# Patient Record
Sex: MALE | Race: BLACK OR AFRICAN AMERICAN | HISPANIC OR LATINO
[De-identification: names, ages, dates, MRNs, and addresses within clinical notes are randomized per-mention and may not be internally consistent; named-entity substitution may affect disease eponyms.]

---

## 2023-02-16 ENCOUNTER — NON-APPOINTMENT (OUTPATIENT)
Age: 48
End: 2023-02-16

## 2023-02-16 ENCOUNTER — APPOINTMENT (OUTPATIENT)
Dept: ORTHOPEDIC SURGERY | Facility: CLINIC | Age: 48
End: 2023-02-16
Payer: OTHER MISCELLANEOUS

## 2023-02-16 PROCEDURE — 76882 US LMTD JT/FCL EVL NVASC XTR: CPT

## 2023-02-16 PROCEDURE — 99072 ADDL SUPL MATRL&STAF TM PHE: CPT

## 2023-02-16 PROCEDURE — 99204 OFFICE O/P NEW MOD 45 MIN: CPT

## 2023-02-16 NOTE — ASSESSMENT
[Physical Disability Temporary Total] : temporarily total disabled [Can the patient return to usual work activities as indicated? If yes, indicate date___] : The patient cannot return to usual work activities as indicated.

## 2023-02-16 NOTE — PHYSICAL EXAM
[de-identified] : \par  PHYSICAL EXAM  RIGHT  SHOULDER - RHD \par \par BICEPS DEFORMITY CONSISTENT CLAUS LONG HEAD BICEPS RUPTURE\par MODERATE PROTRACTION \par AROM 110 / 110 / 90 / 10\par TENDER: SA REGION ANTERIOR,  BICEPS GROOVE AND  BICEPS MUSCLE \par \par SPECIAL TESTING :\par HOLLINGSWORTH - POSITIVE \par CATRACHITA - POSITIVE \par SPEED TEST - POSITIVE\par \par SINGH - NEGATIVE \par APPREHENSION AND SUPPRESSION - NEGATIVE \par \par RC STRENGTH TESTING \par SS:  4/5\par SUB 5/5\par IS     5/5\par BICEPS  5/5\par \par SENSATION  - GROSSLY INTACT\par \par  [de-identified] : XR RIGHT SHOULDER 2 VIEWS: 02/12/2023 \par IMPRESSION: NO ACUTE OSSEOUS OR SOFT TISSUE INJURY IS NOTED. JOINT SPACES ARE WELL- PRESERVED. \par \par DIAGNOSTIC ULTRASOUND RIGHT SHOULDER\par \par DIAGNOSTIC SONOGRAPHY of the Rotator Cuff Soft Tissue of the RIGHT SHOULDER was performed in Multiple Scan Planes with varying transducer frequencies.\par Imaging of the Supraspinatus Tendon reveals TENDONITIS,  WITH OUT SIGNIFICANT / COMPLETE TEAR\par Imaging of the Biceps Tendon reveals COMPLETE RUPTURE WITH RETRACTION.\par Imaging of the Subscapularis Tendon reveals no significant tear.\par Imaging of the Infraspinatus Tendon reveals no significant tear.\par Key images were save digitally and reviewed with patient.\par \par

## 2023-02-16 NOTE — DISCUSSION/SUMMARY
[de-identified] : 47-YEAR-OLD GENERALLY HEALTHY MALE RIGHT-HAND-DOMINANT IS EMPLOYED AS A .  WAS LIFTING A HEAVY PIECE 30 POUND INSERTING ON A MACHINE HE FELT A SNAP AND SHARP PULL IN HIS RIGHT SHOULDER.  PATIENT SEEN IN THE EMERGENCY ROOM X-RAYS WERE REPORTEDLY NEGATIVE\par \par PHYSICAL EXAM TODAY REVEALS PAINFUL LIMITED ACTIVE MOTION OF THE SHOULDER WITH OBVIOUS BICEPS DEFORMITY CONSISTENT WITH LONG HEAD OF THE BICEPS RUPTURE.  PATIENT IS UNABLE TO USE HIS ARM BECAUSE OF PAIN AND WEAKNESS HAS PAIN CONSTANTLY SOMETIMES 10/10.  HAS LIMITED RELIEF FROM IBUPROFEN PROVIDED BY THE EMERGENCY ROOM\par \par DIAGNOSTIC ULTRASOUND TODAY REVEALS COMPLETE RUPTURE OF THE BICEPS TENDON PROXIMALLY.\par \par I HAVE ORDERED MRI OF THE SHOULDER TO RULE OUT OTHER INJURIES ASSOCIATED POSSIBLE IMPINGEMENT SYNDROME AND ROTATOR CUFF INJURY\par \par IN HIS AGE GROUP LONG HEAD BICEPS RUPTURE MIGHT REQUIRE REPAIR/TENODESIS\par \par WORK STATUS TEMPORARILY TOTALLY DISABLED

## 2023-02-16 NOTE — HISTORY OF PRESENT ILLNESS
[FreeTextEntry1] : LOCATION:RIGHT SHOULDER \par DATE OF INJURY: 02/12/2023- PATIENT STATED THAT HE  IS A -PT SAID HE WAS Lifting A HEAVY PIECE APPROX. 30LBS  TO INSERT ON MACHINE AND HE FELT A SHARP PULL IN RIGHT SHOULDER  \par NO PREVIOUS SHOULDER PAIN \par QUALITY:SHARP, ACHY, THROBBING \par RADIATING PAIN UP NECK \par CONSTANT \par PAIN LEVEL: 10/10 \par BETTER WITH RESTING, ICE, PACKS, \par WORSE WITH OVER HEAD LIFTING, REACHING BEHIND \par ASSOCIATED SYMPTOMS: LIMITED ROM \par ASSOCIATED CLICKING/LOCKING/POPPING/GRINDING \par PRIOR STUDIES: X-RAY REPORT \par PT VISITED [place] [facility] RO[facility] 02/12/23- PT WAS PRESCRIBED IBUPROFEN AND HAD AN INJECTION, GAVE PT A SLING \par \par

## 2023-03-06 ENCOUNTER — APPOINTMENT (OUTPATIENT)
Dept: ORTHOPEDIC SURGERY | Facility: CLINIC | Age: 48
End: 2023-03-06
Payer: OTHER MISCELLANEOUS

## 2023-03-06 VITALS — HEIGHT: 65 IN | WEIGHT: 174 LBS | BODY MASS INDEX: 28.99 KG/M2

## 2023-03-06 PROCEDURE — 99072 ADDL SUPL MATRL&STAF TM PHE: CPT

## 2023-03-06 PROCEDURE — 99213 OFFICE O/P EST LOW 20 MIN: CPT

## 2023-03-15 ENCOUNTER — APPOINTMENT (OUTPATIENT)
Dept: ORTHOPEDIC SURGERY | Facility: CLINIC | Age: 48
End: 2023-03-15
Payer: OTHER MISCELLANEOUS

## 2023-03-15 PROCEDURE — 99213 OFFICE O/P EST LOW 20 MIN: CPT

## 2023-03-15 PROCEDURE — 99072 ADDL SUPL MATRL&STAF TM PHE: CPT

## 2023-03-15 NOTE — HISTORY OF PRESENT ILLNESS
[de-identified] : RIGHT SHOULDER \par NO CHANGES\par HARD TO SLEEP AT NIGHT \par PAIN: 10/10\par FOLLOW UP MRI RESULTS \par \par \par PREVIOUS HPI\par Symptoms and relevant review of symptoms: LOCATION:RIGHT SHOULDER \par DATE OF INJURY: 02/12/2023- PATIENT STATED THAT HE IS A -PT SAID HE WAS Lifting A HEAVY PIECE APPROX. 30LBS TO INSERT ON MACHINE AND HE FELT A SHARP PULL IN RIGHT SHOULDER \par NO PREVIOUS SHOULDER PAIN \par QUALITY:SHARP, ACHY, THROBBING \par RADIATING PAIN UP NECK \par CONSTANT \par PAIN LEVEL: 10/10 \par BETTER WITH RESTING, ICE, PACKS, \par WORSE WITH OVER HEAD LIFTING, REACHING BEHIND \par ASSOCIATED SYMPTOMS: LIMITED ROM \par ASSOCIATED CLICKING/LOCKING/POPPING/GRINDING \par PRIOR STUDIES: X-RAY REPORT \par PT VISITED [place] [facility] RO[facility] 02/12/23- PT WAS PRESCRIBED IBUPROFEN AND HAD AN INJECTION, GAVE PT A SLING \par

## 2023-03-15 NOTE — DISCUSSION/SUMMARY
[de-identified] : PREOP SHOULDER SURGERY DISCUSSION:\par \par PROCEDURE DISCUSSED - QUESTIONS ANSWERED\par PATIENT WISHES TO PROCEED\par \par POST OP CARE AND LIMITATIONS REVIEWED - HANDOUT PROVIDED \par \par COLD PACKS RECOMMENDED\par ANALGESICS AND  ANTI NAUSEA MEDS PRESCRIBED \par COLACE RECOMMENDED\par \par \par THERE ARE NO GUARANTEES THAT ALL SYMPTOMS WILL BE ALLEVIATED  \par SHOULDER ARTHROSCOPY, ACROMIOPLASTY, DEBRIDEMENT,  RC REPAIR AND LABRUM REPAIRS- ON AVERAGE 75- 85% SATISFACTORY RESULTS FOR TEARS < 3CM AFTER 9-12 MONTHS HEALING AND REHABILITATION. \par \par REPAIRS WILL REQUIRE STRICT SHOULDER IMMOBILIZER 4-6 WEEKS\par \par RC TEARS 3CM OR LARGER MAY REQUIRE COLLAGEN PATCH AUGMENTATION GENERALLY HAVE LESS SATISFACTORY RESULTS\par \par PHYSICAL THERAPY REQUIRED 2X WEEK FOR  MINIMUM 8-12 WEEKS FOR ALL PROCEDURES \par CONTINUED HOME EXERCISES 6-9 MONTHS AFTER THAT REQUIRED FOR OPTIMAL OUTCOMES \par \par ROUTINE SURGICAL AND ANESTHETIC RISKS INCLUDE RISK OF SURGICAL INFECTION, ANESTHETIC COMPLICATION OR ALLERGY, POSSIBLE RETEARS OR PROGRESSION OF TEAR, STIFFNESS OF SHOULDER AND UNSATISFACTORY OUTCOMES\par \par PATIENT UNDERSTANDS AND WISHES TO PROCEED\par

## 2023-03-15 NOTE — PHYSICAL EXAM
[de-identified] : \par  PHYSICAL EXAM  RIGHT  SHOULDER - RHD \par \par BICEPS DEFORMITY CONSISTENT WITH LONG HEAD BICEPS RUPTURE\par MODERATE PROTRACTION \par AROM 110 / 110 / 90 / 10\par TENDER: SA REGION ANTERIOR,  BICEPS GROOVE AND  BICEPS MUSCLE \par \par SPECIAL TESTING :\par HOLLINGSWORTH - POSITIVE \par CATRACHITA - POSITIVE \par SPEED TEST - POSITIVE\par \par SINGH - NEGATIVE \par APPREHENSION AND SUPPRESSION - NEGATIVE \par \par RC STRENGTH TESTING \par SS:  4/5\par SUB 5/5\par IS     5/5\par BICEPS  5/5\par \par SENSATION  - GROSSLY INTACT\par \par  [de-identified] :  \par EXAM:  MRI RIGHT SHOULDER WITHOUT CONTRAST\par Date of Exam: 02-\par IMPRESSION: \par \par 1. Mild AC joint arthrosis.\par 2. Mildly thickened and inflamed subacromial/subdeltoid bursa. There is no significant subacromial enthesophyte formation, and the undersurface of the acromion is flat.\par 3. Study negative for partial or full-thickness rotator cuff tear. There is moderate tendinosis in the supraspinatus and infraspinatus tendons.\par 4. Complete rupture of the long biceps tendon; the tendon is torn off of the superior glenoid, and retracted downward into the arm.\par 5. The superior labrum appears truncated; the remainder o

## 2023-03-21 ENCOUNTER — APPOINTMENT (OUTPATIENT)
Dept: ORTHOPEDIC SURGERY | Facility: HOSPITAL | Age: 48
End: 2023-03-21

## 2023-03-24 ENCOUNTER — APPOINTMENT (OUTPATIENT)
Dept: ORTHOPEDIC SURGERY | Facility: CLINIC | Age: 48
End: 2023-03-24

## 2023-04-26 ENCOUNTER — FORM ENCOUNTER (OUTPATIENT)
Age: 48
End: 2023-04-26

## 2023-04-26 ENCOUNTER — APPOINTMENT (OUTPATIENT)
Dept: ORTHOPEDIC SURGERY | Facility: CLINIC | Age: 48
End: 2023-04-26
Payer: OTHER MISCELLANEOUS

## 2023-04-26 PROCEDURE — 99213 OFFICE O/P EST LOW 20 MIN: CPT

## 2023-05-02 ENCOUNTER — FORM ENCOUNTER (OUTPATIENT)
Age: 48
End: 2023-05-02

## 2023-06-01 ENCOUNTER — APPOINTMENT (OUTPATIENT)
Dept: ORTHOPEDIC SURGERY | Facility: CLINIC | Age: 48
End: 2023-06-01
Payer: OTHER MISCELLANEOUS

## 2023-06-01 PROCEDURE — 99213 OFFICE O/P EST LOW 20 MIN: CPT

## 2023-06-01 NOTE — PHYSICAL EXAM
[de-identified] : \par  PHYSICAL EXAM  RIGHT  SHOULDER - RHD \par \par BICEPS DEFORMITY CONSISTENT WITH LONG HEAD BICEPS RUPTURE\par MODERATE PROTRACTION \par AROM  70 / 70\par TENDER: SA REGION ANTERIOR,  BICEPS GROOVE AND  BICEPS MUSCLE \par \par SPECIAL TESTING :\par HOLLINGSWORTH - POSITIVE \par CATRACHITA - POSITIVE \par SPEED TEST - POSITIVE\par \par SINGH - NEGATIVE \par APPREHENSION AND SUPPRESSION - NEGATIVE \par \par RC STRENGTH TESTING \par SS:  4/5\par SUB 5/5\par IS     5/5\par BICEPS  5/5\par \par SENSATION  - GROSSLY INTACT\par \par

## 2023-06-01 NOTE — DISCUSSION/SUMMARY
[de-identified] : SHOULDER MUCH WORSE AFTER PHYSICAL THERAPY \par \par REQUEST [facility] FOR ARTHROSCOPIC SHOULDER SURGERY

## 2023-06-01 NOTE — ASSESSMENT
[Can the patient return to usual work activities as indicated? If yes, indicate date___] : The patient cannot return to usual work activities as indicated. [Physical Disability Temporary Total] : temporarily total disabled

## 2023-06-01 NOTE — HISTORY OF PRESENT ILLNESS
[FreeTextEntry1] : LOCATION:RIGHT SHOULDER \par DATE OF INJURY: 02/12/2023- PATIENT STATED THAT HE  IS A - PATIENT SAID HE WAS Lifting A HEAVY PIECE APPROX. 30 LBS  TO INSERT ON MACHINE AND HE FELT A SHARP PULL IN RIGHT SHOULDER  \par MARCH 31, 2023- RIGHT   ARLETTE, BICEPS TENODESIS DEBRIDEMENT\par PATIENT SURGERY WAS DENIED- PT WADS ADVISED TO DO P.T FIRST \par QUALITY:SHARP, ACHY, THROBBING \par RADIATING PAIN UP NECK \par CONSTANT \par PAIN LEVEL: 10/10 \par BETTER WITH RESTING, ICE, PACKS, \par WORSE WITH OVER HEAD LIFTING, REACHING BEHIND \par ASSOCIATED SYMPTOMS: LIMITED ROM \par ASSOCIATED CLICKING/LOCKING/POPPING/GRINDING \par PRIOR STUDIES: X-RAY REPORT \par PT VISITED [place] [facility] 02/12/23- PT WAS PRESCRIBED IBUPROFEN AND HAD AN INJECTION, GAVE PT A SLING \par \par

## 2023-06-01 NOTE — PHYSICAL EXAM
[de-identified] : \par  PHYSICAL EXAM  RIGHT  SHOULDER - RHD \par \par BICEPS DEFORMITY CONSISTENT WITH LONG HEAD BICEPS RUPTURE\par MODERATE PROTRACTION \par AROM 110 / 110 / 90 / 10\par TENDER: SA REGION ANTERIOR,  BICEPS GROOVE AND  BICEPS MUSCLE \par \par SPECIAL TESTING :\par HOLLINGSWORTH - POSITIVE \par CATRACHITA - POSITIVE \par SPEED TEST - POSITIVE\par \par SINGH - NEGATIVE \par APPREHENSION AND SUPPRESSION - NEGATIVE \par \par RC STRENGTH TESTING \par SS:  4/5\par SUB 5/5\par IS     5/5\par BICEPS  5/5\par \par SENSATION  - GROSSLY INTACT\par \par

## 2023-06-01 NOTE — HISTORY OF PRESENT ILLNESS
[FreeTextEntry1] : LOCATION:RIGHT SHOULDER \par SHOULDER PAIN MUCH WORSE AFTER THERAPY \par \par \par DATE OF INJURY: 02/12/2023- PATIENT STATED THAT HE  IS A - PATIENT SAID HE WAS Lifting A HEAVY PIECE APPROX. 30 LBS  TO INSERT ON MACHINE AND HE FELT A SHARP PULL IN RIGHT SHOULDER  \par \par RIGHT BICEPS RUPTURE - SURGERY [facility] REQUESTED - NOT APPROVED \par \par PATIENT DID 6 SESSIONS OF PHYSICAL THERPAY WITH NO RELIEF- PAIN HAS GOTTEN WORSE \par QUALITY:SHARP, ACHY, THROBBING \par RADIATING PAIN UP NECK \par CONSTANT \par PAIN LEVEL: 10/10 \par BETTER WITH RESTING, ICE, PACKS, \par WORSE WITH OVER HEAD LIFTING, REACHING BEHIND \par ASSOCIATED SYMPTOMS: LIMITED ROM \par ASSOCIATED CLICKING/LOCKING/POPPING/GRINDING \par PRIOR STUDIES: X-RAY REPORT \par PT VISITED [place] [facility] RO[facility] 02/12/23- PT WAS PRESCRIBED IBUPROFEN AND HAD AN INJECTION, GAVE PT A SLING \par \par

## 2023-06-01 NOTE — HISTORY OF PRESENT ILLNESS
[FreeTextEntry1] : LOCATION:RIGHT SHOULDER \par DATE OF INJURY: 02/12/2023- PATIENT STATED THAT HE  IS A - PATIENT SAID HE WAS Lifting A HEAVY PIECE APPROX. 30 LBS  TO INSERT ON MACHINE AND HE FELT A SHARP PULL IN RIGHT SHOULDER  \par MARCH 31, 2023- RIGHT   ARLETTE, BICEPS TENODESIS DEBRIDEMENT\par QUALITY:SHARP, ACHY, THROBBING \par RADIATING PAIN UP NECK \par CONSTANT \par PAIN LEVEL: 10/10 \par BETTER WITH RESTING, ICE, PACKS, \par WORSE WITH OVER HEAD LIFTING, REACHING BEHIND \par ASSOCIATED SYMPTOMS: LIMITED ROM \par ASSOCIATED CLICKING/LOCKING/POPPING/GRINDING \par PRIOR STUDIES: X-RAY REPORT \par PT VISITED [place] [facility] RO[facility] 02/12/23- PT WAS PRESCRIBED IBUPROFEN AND HAD AN INJECTION, GAVE PT A SLING \par \par

## 2023-06-01 NOTE — PHYSICAL EXAM
[de-identified] : \par  PHYSICAL EXAM  RIGHT  SHOULDER - RHD \par \par BICEPS DEFORMITY CONSISTENT WITH LONG HEAD BICEPS RUPTURE\par MODERATE PROTRACTION \par AROM 110 / 110 / 90 / 10\par TENDER: SA REGION ANTERIOR,  BICEPS GROOVE AND  BICEPS MUSCLE \par \par SPECIAL TESTING :\par HOLLINGSWORTH - POSITIVE \par CATRACHITA - POSITIVE \par SPEED TEST - POSITIVE\par \par SINGH - NEGATIVE \par APPREHENSION AND SUPPRESSION - NEGATIVE \par \par RC STRENGTH TESTING \par SS:  4/5\par SUB 5/5\par IS     5/5\par BICEPS  5/5\par \par SENSATION  - GROSSLY INTACT\par \par  [de-identified] : \par  \par EXAM:  MRI RIGHT SHOULDER WITHOUT CONTRAST\par Date of Exam: 02-\par IMPRESSION: \par \par 1. Mild AC joint arthrosis.\par 2. Mildly thickened and inflamed subacromial/subdeltoid bursa. There is no significant subacromial enthesophyte formation, and the undersurface of the acromion is flat.\par 3. Study negative for partial or full-thickness rotator cuff tear. There is moderate tendinosis in the supraspinatus and infraspinatus tendons.\par 4. Complete rupture of the long biceps tendon; the tendon is torn off of the superior glenoid, and retracted downward into the arm.\par 5. The superior labrum appears truncated; the remainder of the capsulolabral complex appears normal.

## 2023-06-01 NOTE — DISCUSSION/SUMMARY
[de-identified] : PREOP SHOULDER SURGERY DISCUSSION:\par \par PROCEDURE DISCUSSED - QUESTIONS ANSWERED\par PATIENT WISHES TO PROCEED\par \par POST OP CARE AND LIMITATIONS REVIEWED - HANDOUT PROVIDED \par \par COLD PACKS RECOMMENDED\par ANALGESICS AND  ANTI NAUSEA MEDS PRESCRIBED \par COLACE RECOMMENDED\par \par \par THERE ARE NO GUARANTEES THAT ALL SYMPTOMS WILL BE ALLEVIATED  \par SHOULDER ARTHROSCOPY, ACROMIOPLASTY, DEBRIDEMENT,  RC REPAIR AND LABRUM REPAIRS- ON AVERAGE 75- 85% SATISFACTORY RESULTS FOR TEARS < 3CM AFTER 9-12 MONTHS HEALING AND REHABILITATION. \par \par REPAIRS WILL REQUIRE STRICT SHOULDER IMMOBILIZER 4-6 WEEKS\par \par RC TEARS 3CM OR LARGER MAY REQUIRE COLLAGEN PATCH AUGMENTATION GENERALLY HAVE LESS SATISFACTORY RESULTS\par \par PHYSICAL THERAPY REQUIRED 2X WEEK FOR  MINIMUM 8-12 WEEKS FOR ALL PROCEDURES \par CONTINUED HOME EXERCISES 6-9 MONTHS AFTER THAT REQUIRED FOR OPTIMAL OUTCOMES \par \par ROUTINE SURGICAL AND ANESTHETIC RISKS INCLUDE RISK OF SURGICAL INFECTION, ANESTHETIC COMPLICATION OR ALLERGY, POSSIBLE RETEARS OR PROGRESSION OF TEAR, STIFFNESS OF SHOULDER AND UNSATISFACTORY OUTCOMES\par \par PATIENT UNDERSTANDS AND WISHES TO PROCEED\par

## 2023-06-21 ENCOUNTER — APPOINTMENT (OUTPATIENT)
Dept: ORTHOPEDIC SURGERY | Facility: CLINIC | Age: 48
End: 2023-06-21
Payer: OTHER MISCELLANEOUS

## 2023-06-21 PROCEDURE — 99213 OFFICE O/P EST LOW 20 MIN: CPT

## 2023-06-21 NOTE — PHYSICAL EXAM
[de-identified] : PHYSICAL EXAM  RIGHT  SHOULDER - RHD \par BICEPS DEFORMITY CONSISTENT WITH LONG HEAD BICEPS RUPTURE\par \par MODERATE PROTRACTION \par AROM  \par FLEXION - 75 \par ABDUCTION - 75 \par EXT ROT @ 90 - 70 \par INT ROT @90 -  10 \par TENDER: SA REGION ANTERIOR,  BICEPS GROOVE AND  BICEPS MUSCLE \par \par SPECIAL TESTING :\par HOLLINGSWORTH - POSITIVE \par CATRACHITA - POSITIVE \par SPEED TEST - POSITIVE\par \par SINGH - NEGATIVE \par APPREHENSION AND SUPPRESSION - NEGATIVE \par \par RC STRENGTH TESTING \par SS:  4/5\par SUB 5/5\par IS     5/5\par BICEPS  5/5\par \par SENSATION  - GROSSLY INTACT\par \par

## 2023-06-21 NOTE — HISTORY OF PRESENT ILLNESS
[FreeTextEntry1] : LOCATION:RIGHT SHOULDER \par  SLU TODAY  \par SHOULDER PAIN MUCH WORSE AFTER THERAPY\par DATE OF INJURY: 02/12/2023- PATIENT STATED THAT HE  IS A - PATIENT SAID HE WAS Lifting A HEAVY PIECE APPROX. 30 LBS  TO INSERT ON MACHINE AND HE FELT A SHARP PULL IN RIGHT SHOULDER  \par \par RIGHT BICEPS RUPTURE - SURGERY AUTHORIZED REQUESTED - NOT APPROVED BY WORKERS COMPENSATION\par \par PATIENT DID 6-7 SESSIONS OF PHYSICAL THERPAY WITH NO RELIEF- PAIN HAS GOTTEN WORSE \par QUALITY:SHARP, ACHY, THROBBING \par RADIATING PAIN UP NECK \par CONSTANT \par PAIN LEVEL: 10/10 \par BETTER WITH RESTING, ICE, PACKS, \par WORSE WITH OVER HEAD LIFTING, REACHING BEHIND, AT NIGHT , UNAABLE TO SLEEP  \par ASSOCIATED SYMPTOMS: LIMITED ROM \par ASSOCIATED CLICKING/LOCKING/POPPING/GRINDING \par PRIOR STUDIES: X-RAY REPORT \par PT VISITED [place] [facility] 02/12/23- PT WAS PRESCRIBED IBUPROFEN AND HAD AN INJECTION, GAVE PT A SLING \par \par

## 2023-06-21 NOTE — ASSESSMENT
[Physical Disability Temporary Total] : temporarily total disabled [FreeTextEntry1] : Schedule Loss of Use\par \par Based upon the NOVEMBER 22, 2017  [facility] Worker's Compensation guidelines \par there is a 80% scheduled loss of use of the RIGHT SHOUDLER \par 50% LOSS FLEXION + 15% LOSS ROTATION + 15% BICEPS RUPTURE .  [Can the patient return to usual work activities as indicated? If yes, indicate date___] : The patient cannot return to usual work activities as indicated. [FreeTextEntry7] : Schedule Loss of Use\par \par Based upon the NOVEMBER 22, 2017  [facility] Worker's Compensation guidelines \par there is a 80% scheduled loss of use of the RIGHT SHOUDLER \par 50% LOSS FLEXION + 15% LOSS ROTATION + 15%

## 2023-07-12 ENCOUNTER — APPOINTMENT (OUTPATIENT)
Dept: ORTHOPEDIC SURGERY | Facility: CLINIC | Age: 48
End: 2023-07-12
Payer: OTHER MISCELLANEOUS

## 2023-07-12 PROCEDURE — 99213 OFFICE O/P EST LOW 20 MIN: CPT

## 2023-07-12 NOTE — HISTORY OF PRESENT ILLNESS
[FreeTextEntry1] : LOCATION:RIGHT SHOULDER  \par SHOULDER PAIN MUCH WORSE AFTER THERAPY\par DATE OF INJURY: 02/12/2023- PATIENT STATED THAT HE  IS A - PATIENT SAID HE WAS Lifting A HEAVY PIECE APPROX. 30 LBS  TO INSERT ON MACHINE AND HE FELT A SHARP PULL IN RIGHT SHOULDER  \par \par RIGHT BICEPS RUPTURE - SURGERY AUTHORIZED REQUESTED - NOT APPROVED BY WORKERS COMPENSATION\par \par PATIENT DID 6-7 SESSIONS OF PHYSICAL THERPAY WITH NO RELIEF- PAIN HAS GOTTEN WORSE \par QUALITY:SHARP, ACHY, THROBBING \par RADIATING PAIN UP NECK \par CONSTANT \par PAIN LEVEL: 10/10 \par BETTER WITH RESTING, ICE, PACKS, \par WORSE WITH OVER HEAD LIFTING, REACHING BEHIND, AT NIGHT , UNAABLE TO SLEEP  \par ASSOCIATED SYMPTOMS: LIMITED ROM \par ASSOCIATED CLICKING/LOCKING/POPPING/GRINDING \par PRIOR STUDIES: X-RAY REPORT \par PT VISITED [place] [facility] RO[facility] 02/12/23- PT WAS PRESCRIBED IBUPROFEN AND HAD AN INJECTION, GAVE PT A SLING \par \par \par

## 2023-07-12 NOTE — DISCUSSION/SUMMARY
[de-identified] : SHOULDER MUCH WORSE AFTER PHYSICAL THERAPY \par \par REQUEST AUTHORIZATION  FOR SHOULDER SURGERY INCLUDING PROXIMAL BICEP TENODESIS , AND TREATMENT OF RELATED INJURIES. \par \par PATIENT HAS COMPLETE PROXIMAL BICEP RUPTURE.  IF IT IS NOT REPAIRED  HE WILL LOSE SIGNIFICANTLY STRENGTH IN SHOULDER AND ELBOW THAT WILL NOT BE ABLE TO FIX ON A LATER DATE \par

## 2023-07-12 NOTE — PHYSICAL EXAM
[de-identified] : PHYSICAL EXAM  RIGHT  SHOULDER - RHD \par BICEPS DEFORMITY CONSISTENT WITH LONG HEAD BICEPS RUPTURE\par \par MODERATE PROTRACTION \par AROM  \par FLEXION - 75 \par ABDUCTION - 75 \par EXT ROT @ 90 - 70 \par INT ROT @90 -  10 \par TENDER: SA REGION ANTERIOR,  BICEPS GROOVE AND  BICEPS MUSCLE \par \par SPECIAL TESTING :\par HOLLINGSWORTH - POSITIVE \par CATRACHITA - POSITIVE \par SPEED TEST - POSITIVE\par \par SINGH - NEGATIVE \par APPREHENSION AND SUPPRESSION - NEGATIVE \par \par RC STRENGTH TESTING \par SS:  4/5\par SUB 5/5\par IS     5/5\par BICEPS  5/5\par \par SENSATION  - GROSSLY INTACT\par \par

## 2023-08-11 ENCOUNTER — APPOINTMENT (OUTPATIENT)
Dept: ORTHOPEDIC SURGERY | Facility: CLINIC | Age: 48
End: 2023-08-11
Payer: OTHER MISCELLANEOUS

## 2023-08-11 DIAGNOSIS — Z78.9 OTHER SPECIFIED HEALTH STATUS: ICD-10-CM

## 2023-08-11 PROCEDURE — 99213 OFFICE O/P EST LOW 20 MIN: CPT

## 2023-08-11 RX ORDER — ONDANSETRON 8 MG/1
8 TABLET, ORALLY DISINTEGRATING ORAL 3 TIMES DAILY
Qty: 15 | Refills: 0 | Status: ACTIVE | COMMUNITY
Start: 2023-03-15 | End: 1900-01-01

## 2023-08-11 NOTE — DISCUSSION/SUMMARY
[de-identified] : PREOP SHOULDER SURGERY DISCUSSION:  PROCEDURE DISCUSSED - QUESTIONS ANSWERED PATIENT WISHES TO PROCEED  POST OP CARE AND LIMITATIONS REVIEWED - HANDOUT PROVIDED   COLD PACKS RECOMMENDED ANALGESICS AND  ANTI NAUSEA MEDS PRESCRIBED  COLACE RECOMMENDED   THERE ARE NO GUARANTEES THAT ALL SYMPTOMS WILL BE ALLEVIATED   SHOULDER ARTHROSCOPY, ACROMIOPLASTY, DEBRIDEMENT,  RC REPAIR AND LABRUM REPAIRS- ON AVERAGE 75- 85% SATISFACTORY RESULTS FOR TEARS < 3CM AFTER 9-12 MONTHS HEALING AND REHABILITATION.   REPAIRS WILL REQUIRE STRICT SHOULDER IMMOBILIZER 4-6 WEEKS  RC TEARS 3CM OR LARGER MAY REQUIRE COLLAGEN PATCH AUGMENTATION GENERALLY HAVE LESS SATISFACTORY RESULTS  PHYSICAL THERAPY REQUIRED 2X WEEK FOR  MINIMUM 8-12 WEEKS FOR ALL PROCEDURES  CONTINUED HOME EXERCISES 6-9 MONTHS AFTER THAT REQUIRED FOR OPTIMAL OUTCOMES   ROUTINE SURGICAL AND ANESTHETIC RISKS INCLUDE RISK OF SURGICAL INFECTION, ANESTHETIC COMPLICATION OR ALLERGY, POSSIBLE RETEARS OR PROGRESSION OF TEAR, STIFFNESS OF SHOULDER AND UNSATISFACTORY OUTCOMES  PATIENT UNDERSTANDS AND WISHES TO PROCEED

## 2023-08-11 NOTE — HISTORY OF PRESENT ILLNESS
[FreeTextEntry1] : LOCATION:RIGHT SHOULDER   SHOULDER PAIN MUCH WORSE AFTER THERAPY DATE OF INJURY: 02/12/2023- PATIENT STATED THAT HE IS A - PATIENT SAID HE WAS Lifting A HEAVY PIECE APPROX. 30 LBS TO INSERT ON MACHINE AND HE FELT A SHARP PULL IN RIGHT SHOULDER   RIGHT BICEPS RUPTURE - SURGERY AUTHORIZED REQUESTED - NOT APPROVED BY WORKERS COMPENSATION PATIENT DID 6-7 SESSIONS OF PHYSICAL THERPAY WITH NO RELIEF- PAIN HAS GOTTEN WORSE  QUALITY:SHARP, ACHY, THROBBING  RADIATING PAIN UP NECK  CONSTANT  PAIN LEVEL: 10/10  BETTER WITH RESTING, ICE, PACKS,  WORSE WITH OVER HEAD LIFTING, REACHING BEHIND, AT NIGHT , UNAABLE TO SLEEP   ASSOCIATED SYMPTOMS: LIMITED ROM  ASSOCIATED CLICKING/LOCKING/POPPING/GRINDING  PRIOR STUDIES: X-RAY REPORT  PT VISITED [place] [facility] ROOM 02/12/23- PT WAS PRESCRIBED IBUPROFEN AND HAD AN INJECTION, GAVE PT A SLING

## 2023-08-11 NOTE — PHYSICAL EXAM
[de-identified] : Constitutional:  The patient is Healthy-appearing, Normally developed, Well Nourished and in No apparent distress.   Gait: The patient ambulates with a normal balance, gait and no limp.  Cardiovascular System:  The capillary refill is less than 2 seconds in distal UE   Pulmonary  Breathing non-labored, no audible rhonchi  Skin:  There are no obvious skin abnormalities or rashes  Neuro: Alert and Oriented x 3. normal mood and affect  Spine: Cervical Spine ROM is limited only by stiffness consistent with age with only minimal discomfort at end ROM     PHYSICAL EXAM  RIGHT  SHOULDER - RHD  BICEPS DEFORMITY CONSISTENT WITH LONG HEAD BICEPS RUPTURE  MODERATE PROTRACTION  AROM   FLEXION - 75  ABDUCTION - 75  EXT ROT @ 90 - 70  INT ROT @90 -  10  TENDER: SA REGION ANTERIOR,  BICEPS GROOVE AND  BICEPS MUSCLE   SPECIAL TESTING : HOLLINGSWORTH - POSITIVE  CATRACHITA - POSITIVE  SPEED TEST - POSITIVE  SINGH - NEGATIVE  APPREHENSION AND SUPPRESSION - NEGATIVE   RC STRENGTH TESTING  SS:  4/5 SUB 5/5 IS     5/5 BICEPS  5/5  SENSATION  - GROSSLY INTACT

## 2023-08-16 ENCOUNTER — APPOINTMENT (OUTPATIENT)
Dept: ORTHOPEDIC SURGERY | Facility: CLINIC | Age: 48
End: 2023-08-16

## 2023-08-22 ENCOUNTER — APPOINTMENT (OUTPATIENT)
Dept: ORTHOPEDIC SURGERY | Facility: HOSPITAL | Age: 48
End: 2023-08-22
Payer: OTHER MISCELLANEOUS

## 2023-08-22 PROCEDURE — 23430 REPAIR BICEPS TENDON: CPT | Mod: RT,59

## 2023-08-22 PROCEDURE — 29823 SHO ARTHRS SRG XTNSV DBRDMT: CPT | Mod: RT,59

## 2023-08-22 PROCEDURE — 29820 SHO ARTHRS SRG PRTL SYNVCT: CPT | Mod: RT,59

## 2023-08-22 PROCEDURE — 29826 SHO ARTHRS SRG DECOMPRESSION: CPT | Mod: RT

## 2023-08-25 ENCOUNTER — APPOINTMENT (OUTPATIENT)
Dept: ORTHOPEDIC SURGERY | Facility: CLINIC | Age: 48
End: 2023-08-25
Payer: OTHER MISCELLANEOUS

## 2023-08-25 PROCEDURE — 99024 POSTOP FOLLOW-UP VISIT: CPT

## 2023-08-25 RX ORDER — CEPHALEXIN 500 MG/1
500 TABLET ORAL 3 TIMES DAILY
Qty: 21 | Refills: 0 | Status: ACTIVE | COMMUNITY
Start: 2023-08-25 | End: 1900-01-01

## 2023-08-25 RX ORDER — OXYCODONE AND ACETAMINOPHEN 7.5; 325 MG/1; MG/1
7.5-325 TABLET ORAL
Qty: 28 | Refills: 0 | Status: ACTIVE | COMMUNITY
Start: 2023-03-15 | End: 1900-01-01

## 2023-08-25 NOTE — PHYSICAL EXAM
[de-identified] : POST OP SHOULDER EXAM   PORTALS HEALING WELL - NO ERYTHEMA OR CALOR SUTURES REMOVED, STERISTRIPS AND WATERPROOF BANDAIDS  APPLIED   AROM  NT  DISTAL CMS INTACT

## 2023-08-25 NOTE — ASSESSMENT
[Physical Disability Temporary Total] : temporarily total disabled [FreeTextEntry1] : AUGUST 22, 2023, - RIGHT BICEPS TENODESIS , BRISSA CHONG 3MM  [Can the patient return to usual work activities as indicated? If yes, indicate date___] : The patient cannot return to usual work activities as indicated.

## 2023-08-25 NOTE — DISCUSSION/SUMMARY
[de-identified] : POST OP REPAIR: AUGUST 22, 2023, - RIGHT BICEPS TENODESIS , ARLETTE, PASTA 3MM   COLD THERAPY,ANALGESICS AS NEEDED  SHOULDER IMMOBILIZED FULL TIME X 4-6  WEEKS - STRICT NO AROM - DESKTOP WORK ALLOWED  SCAPULAR SQUEEZES / ROLLS, ELBOW, WRIST, HAND AROM TID   START PENDULUM EXERCISES, EXT ROT  3 WEEK POSTOP  START AAROM FLEXION, LOBO  5 WEEKS POST OP  6 WEEKS POSTOP  - ADVANCE TO P.T.

## 2023-08-25 NOTE — HISTORY OF PRESENT ILLNESS
[de-identified] : 8/22/23 - S/P RIGHT BICEP REPAIR, FIRST POST OP VISIT  [FreeTextEntry1] : AUGUST 22, 2023, - RIGHT BICEPS TENODESIS , BRISSA CHONG 3MM  PAIN 8/10    DATE OF INJURY: 02/12/2023- PATIENT STATED THAT HE IS A - PATIENT SAID HE WAS Lifting A HEAVY PIECE APPROX. 30 LBS TO INSERT ON MACHINE AND HE FELT A SHARP PULL IN RIGHT SHOULDER   RIGHT BICEPS RUPTURE - SURGERY AUTHORIZED REQUESTED - NOT APPROVED BY WORKERS COMPENSATION PATIENT DID 6-7 SESSIONS OF PHYSICAL THERPAY WITH NO RELIEF- PAIN HAS GOTTEN WORSE  QUALITY:SHARP, ACHY, THROBBING  RADIATING PAIN UP NECK  CONSTANT  PAIN LEVEL: 10/10  BETTER WITH RESTING, ICE, PACKS,  WORSE WITH OVER HEAD LIFTING, REACHING BEHIND, AT NIGHT , UNAABLE TO SLEEP   ASSOCIATED SYMPTOMS: LIMITED ROM  ASSOCIATED CLICKING/LOCKING/POPPING/GRINDING  PRIOR STUDIES: X-RAY REPORT  PT VISITED [place] [facility] RO[facility] 02/12/23- PT WAS PRESCRIBED IBUPROFEN AND HAD AN INJECTION, GAVE PT A SLING

## 2023-09-01 ENCOUNTER — APPOINTMENT (OUTPATIENT)
Dept: ORTHOPEDIC SURGERY | Facility: CLINIC | Age: 48
End: 2023-09-01
Payer: OTHER MISCELLANEOUS

## 2023-09-01 PROCEDURE — 99024 POSTOP FOLLOW-UP VISIT: CPT

## 2023-09-01 NOTE — HISTORY OF PRESENT ILLNESS
[FreeTextEntry1] : AUGUST 22, 2023, - RIGHT BICEPS TENODESIS , BRISSA CHONG 3MM  PAIN 8/10 DATE OF INJURY: 02/12/2023- PATIENT STATED THAT HE IS A - PATIENT SAID HE WAS Lifting A HEAVY PIECE APPROX. 30 LBS TO INSERT ON MACHINE AND HE FELT A SHARP PULL IN RIGHT SHOULDER   RIGHT BICEPS RUPTURE - SURGERY AUTHORIZED REQUESTED - NOT APPROVED BY WORKERS COMPENSATION PATIENT DID 6-7 SESSIONS OF PHYSICAL THERPAY WITH NO RELIEF- PAIN HAS GOTTEN WORSE  QUALITY: SHARP, ACHY, THROBBING  RADIATING PAIN UP NECK  CONSTANT  PAIN LEVEL: 10/10  BETTER WITH RESTING, ICE, PACKS,  WORSE WITH OVER HEAD LIFTING, REACHING BEHIND, AT NIGHT , UNAABLE TO SLEEP   ASSOCIATED SYMPTOMS: LIMITED ROM  ASSOCIATED CLICKING/LOCKING/POPPING/GRINDING  PRIOR STUDIES: X-RAY REPORT  PT VISITED [place] [facility] RO[facility] 02/12/23- PT WAS PRESCRIBED IBUPROFEN AND HAD AN INJECTION, GAVE PT A SLING

## 2023-09-01 NOTE — DISCUSSION/SUMMARY
[de-identified] : POST OP REPAIR: AUGUST 22, 2023, - RIGHT BICEPS TENODESIS , ARLETTE, PASTA 3MM   COLD THERAPY,ANALGESICS AS NEEDED  SHOULDER IMMOBILIZED FULL TIME X 4-6  WEEKS - STRICT NO AROM - DESKTOP WORK ALLOWED  SCAPULAR SQUEEZES / ROLLS, ELBOW, WRIST, HAND AROM TID   START PENDULUM EXERCISES, EXT ROT  3 WEEK POSTOP  START AAROM FLEXION, LOBO  5 WEEKS POST OP  6 WEEKS POSTOP  - ADVANCE TO P.T.

## 2023-09-01 NOTE — PHYSICAL EXAM
[de-identified] : RIGHT SHOULDER XRAY (2 VIEWS - AP AND OUTLET)  -   NO OBVIOUS FRACTURE OR DISLOCATION,  SATISFACTORY DECOMPRESSION NOTED  ,  BICEPS BUTTOMN SEEN IS SATISFACTORY LOCATION

## 2023-10-02 ENCOUNTER — APPOINTMENT (OUTPATIENT)
Dept: ORTHOPEDIC SURGERY | Facility: CLINIC | Age: 48
End: 2023-10-02

## 2023-10-16 ENCOUNTER — APPOINTMENT (OUTPATIENT)
Dept: ORTHOPEDIC SURGERY | Facility: CLINIC | Age: 48
End: 2023-10-16
Payer: OTHER MISCELLANEOUS

## 2023-10-16 PROCEDURE — 99024 POSTOP FOLLOW-UP VISIT: CPT

## 2023-11-27 ENCOUNTER — APPOINTMENT (OUTPATIENT)
Dept: ORTHOPEDIC SURGERY | Facility: CLINIC | Age: 48
End: 2023-11-27
Payer: OTHER MISCELLANEOUS

## 2023-11-27 PROCEDURE — 99212 OFFICE O/P EST SF 10 MIN: CPT

## 2023-11-27 RX ORDER — IBUPROFEN 800 MG/1
800 TABLET ORAL 3 TIMES DAILY
Qty: 90 | Refills: 3 | Status: ACTIVE | COMMUNITY
Start: 2023-08-25 | End: 1900-01-01

## 2024-01-11 ENCOUNTER — APPOINTMENT (OUTPATIENT)
Dept: ORTHOPEDIC SURGERY | Facility: CLINIC | Age: 49
End: 2024-01-11
Payer: OTHER MISCELLANEOUS

## 2024-01-11 DIAGNOSIS — M25.511 PAIN IN RIGHT SHOULDER: ICD-10-CM

## 2024-01-11 PROCEDURE — 99213 OFFICE O/P EST LOW 20 MIN: CPT

## 2024-01-11 NOTE — PHYSICAL EXAM
[de-identified] : POST OP SHOULDER EXAM  AUGUST 22, 2023, - RIGHT BICEPS TENODESIS, ARLETTE, PASTA 3MM   PORTALS HEALING WELL - NO ERYTHEMA OR CALOR  TENDER PROXIMAL BICEPS AT TENODESIS SITE  TENDER MEDIAL SCAPULAR BORDER - TRIGGER POINTS   AROM  120 / 120 / 70 / 15   DISTAL CMS INTACT

## 2024-01-11 NOTE — HISTORY OF PRESENT ILLNESS
[FreeTextEntry1] : RIGHT SHOULDER PAIN  FOLLOW UP  AUGUST 22, 2023, - RIGHT BICEPS TENODESIS, ARLETTE, PASTA 3MM  PAIN LEVEL: 8/10 WITH MOVEMENT    SUBACROMIAL REGION AND MEDIAL SCAPULAR BORDER  PT IS GOING TO PHYSCIAL THERPAY 2 X WEEK-HELPUL  AT HOME EXERCISES-HELPFUL    DATE OF INJURY: 02/12/2023- PATIENT STATED THAT HE IS A - PATIENT SAID HE WAS Lifting A HEAVY PIECE APPROX. 30 LBS TO INSERT ON MACHINE AND HE FELT A SHARP PULL IN RIGHT SHOULDER     PREVIOUS HPI AUGUST 22, 2023, - RIGHT BICEPS TENODESIS , ARLETTE, PASTA 3MM  PAIN 8/10 DATE OF INJURY: 02/12/2023- PATIENT STATED THAT HE IS A - PATIENT SAID HE WAS Lifting A HEAVY PIECE APPROX. 30 LBS TO INSERT ON MACHINE AND HE FELT A SHARP PULL IN RIGHT SHOULDER   RIGHT BICEPS RUPTURE - SURGERY AUTHORIZED REQUESTED - NOT APPROVED BY WORKERS COMPENSATION PATIENT DID 6-7 SESSIONS OF PHYSICAL THERPAY WITH NO RELIEF- PAIN HAS GOTTEN WORSE  QUALITY: SHARP, ACHY, THROBBING  RADIATING PAIN UP NECK  CONSTANT  PAIN LEVEL: 10/10  BETTER WITH RESTING, ICE, PACKS,  WORSE WITH OVER HEAD LIFTING, REACHING BEHIND, AT NIGHT , UNAABLE TO SLEEP   ASSOCIATED SYMPTOMS: LIMITED ROM  ASSOCIATED CLICKING/LOCKING/POPPING/GRINDING  PRIOR STUDIES: X-RAY REPORT  PT VISITED [facility] 02/12/23- PT WAS PRESCRIBED IBUPROFEN AND HAD AN INJECTION, GAVE PT A SLING

## 2024-02-28 ENCOUNTER — APPOINTMENT (OUTPATIENT)
Dept: ORTHOPEDIC SURGERY | Facility: CLINIC | Age: 49
End: 2024-02-28
Payer: OTHER MISCELLANEOUS

## 2024-02-28 PROCEDURE — 99213 OFFICE O/P EST LOW 20 MIN: CPT

## 2024-02-28 RX ORDER — DICLOFENAC SODIUM 75 MG/1
75 TABLET, DELAYED RELEASE ORAL TWICE DAILY
Qty: 60 | Refills: 4 | Status: ACTIVE | COMMUNITY
Start: 2024-02-28 | End: 1900-01-01

## 2024-02-28 NOTE — DISCUSSION/SUMMARY
[de-identified] : POST OP REPAIR: AUGUST 22, 2023, - RIGHT BICEPS TENODESIS , ARLETTE, BRISSA 3MM   CONTINUED PROGRESS AROM  WITH P.T.  CONTINUE  P.T. - REFERRAL PROVIDED   UNABLE TO RETURN TO REGULAR DUTY WORK   FU 6 WEEKS

## 2024-02-28 NOTE — PHYSICAL EXAM
[de-identified] : POST OP SHOULDER EXAM  AUGUST 22, 2023, - RIGHT BICEPS TENODESIS, ARLETTE, PASTA 3MM   PORTALS HEALING WELL - NO ERYTHEMA OR CALOR  TENDER PROXIMAL BICEPS AT TENODESIS SITE  TENDER MEDIAL SCAPULAR BORDER - TRIGGER POINTS   AROM  120 / 130 / 70 / 15   DISTAL CMS INTACT

## 2024-02-28 NOTE — HISTORY OF PRESENT ILLNESS
[FreeTextEntry1] : RIGHT SHOULDER PAIN  FOLLOW UP - 6 MONTHS POST OP  AUGUST 22, 2023, - RIGHT BICEPS TENODESIS, ARLETTE, PASTA 3MM  PAIN LEVEL:7/10   PT IS GOING TO PHYSCIAL THERPAY 2 X WEEK-HELPUL  AT HOME EXERCISES-HELPFUL    DATE OF INJURY: 02/12/2023- PATIENT STATED THAT HE IS A - PATIENT SAID HE WAS Lifting A HEAVY PIECE APPROX. 30 LBS TO INSERT ON MACHINE AND HE FELT A SHARP PULL IN RIGHT SHOULDER     PREVIOUS HPI AUGUST 22, 2023, - RIGHT BICEPS TENODESIS , ARLETTE, PASTA 3MM  PAIN 8/10 DATE OF INJURY: 02/12/2023- PATIENT STATED THAT HE IS A - PATIENT SAID HE WAS Lifting A HEAVY PIECE APPROX. 30 LBS TO INSERT ON MACHINE AND HE FELT A SHARP PULL IN RIGHT SHOULDER   RIGHT BICEPS RUPTURE - SURGERY AUTHORIZED REQUESTED - NOT APPROVED BY WORKERS COMPENSATION PATIENT DID 6-7 SESSIONS OF PHYSICAL THERPAY WITH NO RELIEF- PAIN HAS GOTTEN WORSE  QUALITY: SHARP, ACHY, THROBBING  RADIATING PAIN UP NECK  CONSTANT  PAIN LEVEL: 10/10  BETTER WITH RESTING, ICE, PACKS,  WORSE WITH OVER HEAD LIFTING, REACHING BEHIND, AT NIGHT , UNAABLE TO SLEEP   ASSOCIATED SYMPTOMS: LIMITED ROM  ASSOCIATED CLICKING/LOCKING/POPPING/GRINDING  PRIOR STUDIES: X-RAY REPORT  PT VISITED [facility] 02/12/23- PT WAS PRESCRIBED IBUPROFEN AND HAD AN INJECTION, GAVE PT A SLING

## 2024-04-15 ENCOUNTER — APPOINTMENT (OUTPATIENT)
Dept: ORTHOPEDIC SURGERY | Facility: CLINIC | Age: 49
End: 2024-04-15
Payer: OTHER MISCELLANEOUS

## 2024-04-15 DIAGNOSIS — M75.41 IMPINGEMENT SYNDROME OF RIGHT SHOULDER: ICD-10-CM

## 2024-04-15 PROCEDURE — 99213 OFFICE O/P EST LOW 20 MIN: CPT

## 2024-04-15 RX ORDER — TRAMADOL HYDROCHLORIDE 50 MG/1
50 TABLET, COATED ORAL
Qty: 60 | Refills: 0 | Status: ACTIVE | COMMUNITY
Start: 2023-02-16 | End: 1900-01-01

## 2024-04-15 NOTE — DISCUSSION/SUMMARY
[de-identified] : POST OP REPAIR: AUGUST 22, 2023, - RIGHT BICEPS TENODESIS , ARLETTE, BRISSA 3MM   CONTINUED PROGRESS AROM  WITH P.T.  CONTINUE  P.T. - REFERRAL PROVIDED   UNABLE TO RETURN TO REGULAR DUTY WORK   FU 6 WEEKS

## 2024-04-15 NOTE — HISTORY OF PRESENT ILLNESS
[de-identified] : RIGHT SHOULDER  NO CHANGES  PAIN: 7 /10 HARD TO SLEEP AT NIGHT  FOLLOW UP MRI RESULTS    PREVIOUS HPI Symptoms and relevant review of symptoms: LOCATION:RIGHT SHOULDER  DATE OF INJURY: 02/12/2023- PATIENT STATED THAT HE IS A -PT SAID HE WAS Lifting A HEAVY PIECE APPROX. 30LBS TO INSERT ON MACHINE AND HE FELT A SHARP PULL IN RIGHT SHOULDER  NO PREVIOUS SHOULDER PAIN  QUALITY:SHARP, ACHY, THROBBING  RADIATING PAIN UP NECK  CONSTANT  PAIN LEVEL: 10/10  BETTER WITH RESTING, ICE, PACKS,  WORSE WITH OVER HEAD LIFTING, REACHING BEHIND  ASSOCIATED SYMPTOMS: LIMITED ROM  ASSOCIATED CLICKING/LOCKING/POPPING/GRINDING  PRIOR STUDIES: X-RAY REPORT  PT VISITED [facility] 02/12/23- PT WAS PRESCRIBED IBUPROFEN AND HAD AN INJECTION, GAVE PT A SLING

## 2024-04-15 NOTE — PHYSICAL EXAM
[de-identified] : POST OP SHOULDER EXAM  AUGUST 22, 2023, - RIGHT BICEPS TENODESIS, ARLETTE, PASTA 3MM   PORTALS HEALING WELL - NO ERYTHEMA OR CALOR  TENDER PROXIMAL BICEPS AT TENODESIS SITE  TENDER MEDIAL SCAPULAR BORDER - TRIGGER POINTS   AROM  120 / 130 / 80 / 30   DISTAL CMS INTACT

## 2024-05-01 ENCOUNTER — APPOINTMENT (OUTPATIENT)
Dept: ORTHOPEDIC SURGERY | Facility: CLINIC | Age: 49
End: 2024-05-01
Payer: OTHER MISCELLANEOUS

## 2024-05-01 DIAGNOSIS — M67.813 OTHER SPECIFIED DISORDERS OF TENDON, RIGHT SHOULDER: ICD-10-CM

## 2024-05-01 DIAGNOSIS — S46.111A STRAIN OF MUSCLE, FASCIA AND TENDON OF LONG HEAD OF BICEPS, RIGHT ARM, INITIAL ENCOUNTER: ICD-10-CM

## 2024-05-01 DIAGNOSIS — Z00.00 ENCOUNTER FOR GENERAL ADULT MEDICAL EXAMINATION W/OUT ABNORMAL FINDINGS: ICD-10-CM

## 2024-05-01 PROCEDURE — 99213 OFFICE O/P EST LOW 20 MIN: CPT

## 2024-05-01 RX ORDER — IBUPROFEN 800 MG/1
800 TABLET ORAL 3 TIMES DAILY
Qty: 90 | Refills: 3 | Status: ACTIVE | COMMUNITY
Start: 2024-01-11 | End: 1900-01-01

## 2024-05-01 NOTE — PHYSICAL EXAM
[de-identified] : POST OP SHOULDER EXAM  AUGUST 22, 2023, - RIGHT BICEPS TENODESIS, ARLETTE, PASTA 3MM   PORTALS HEALING WELL - NO ERYTHEMA OR CALOR  TENDER PROXIMAL BICEPS AT TENODESIS SITE  TENDER MEDIAL SCAPULAR BORDER - TRIGGER POINTS   AROM  120 / 130 / 80 / 30   DISTAL CMS INTACT

## 2024-05-01 NOTE — HISTORY OF PRESENT ILLNESS
[FreeTextEntry1] : RIGHT SHOULDER PAIN  FOLLOW UP - 8 MONTHS POST OP  AUGUST 22, 2023, - RIGHT BICEPS TENODESIS, ARLETTE, PASTA 3MM  PAIN LEVEL: 7/10   PT WAS GOING TO PHYSCIAL THERPAY 2 X WEEK-HELPUL  - BUT NO LONGER AUTHORIZED BY [employer] COMP  STILL HAS NOT MET MILESTONES TO RETURN TO WORK  AT HOME EXERCISES-HELPFUL    DATE OF INJURY: 02/12/2023- PATIENT STATED THAT HE IS A - PATIENT SAID HE WAS Lifting A HEAVY PIECE APPROX. 30 LBS TO INSERT ON MACHINE AND HE FELT A SHARP PULL IN RIGHT SHOULDER     PREVIOUS HPI AUGUST 22, 2023, - RIGHT BICEPS TENODESIS , ARLETTE, PASTA 3MM  PAIN 8/10 DATE OF INJURY: 02/12/2023- PATIENT STATED THAT HE IS A - PATIENT SAID HE WAS Lifting A HEAVY PIECE APPROX. 30 LBS TO INSERT ON MACHINE AND HE FELT A SHARP PULL IN RIGHT SHOULDER   RIGHT BICEPS RUPTURE - SURGERY AUTHORIZED REQUESTED - NOT APPROVED BY WORKERS COMPENSATION PATIENT DID 6-7 SESSIONS OF PHYSICAL THERPAY WITH NO RELIEF- PAIN HAS GOTTEN WORSE  QUALITY: SHARP, ACHY, THROBBING  RADIATING PAIN UP NECK  CONSTANT  PAIN LEVEL: 10/10  BETTER WITH RESTING, ICE, PACKS,  WORSE WITH OVER HEAD LIFTING, REACHING BEHIND, AT NIGHT , UNAABLE TO SLEEP   ASSOCIATED SYMPTOMS: LIMITED ROM  ASSOCIATED CLICKING/LOCKING/POPPING/GRINDING  PRIOR STUDIES: X-RAY REPORT  PT VISITED [facility] 02/12/23- PT WAS PRESCRIBED IBUPROFEN AND HAD AN INJECTION, GAVE PT A SLING

## 2024-05-01 NOTE — DISCUSSION/SUMMARY
[de-identified] : PHYSCIAL THERAPY HAS BEEN HELPFUL BUT WAS DENIED BY WORKERS COMPENSATION   WITHOUT EXTENDED PHYSIOTHERAPY PATIENT WILL FAIL TO MAKE RECOVERY GOALS   PLEASE AUTHORIZE EXTENTION PHYSICAL THERAPY   2 X 8 WEEKS      RE-EVALUATE WORKSTATUS JULY 10

## 2024-06-03 ENCOUNTER — APPOINTMENT (OUTPATIENT)
Dept: ORTHOPEDIC SURGERY | Facility: CLINIC | Age: 49
End: 2024-06-03

## 2024-07-10 ENCOUNTER — APPOINTMENT (OUTPATIENT)
Dept: ORTHOPEDIC SURGERY | Facility: CLINIC | Age: 49
End: 2024-07-10

## 2024-07-10 DIAGNOSIS — M67.813 OTHER SPECIFIED DISORDERS OF TENDON, RIGHT SHOULDER: ICD-10-CM

## 2024-07-10 DIAGNOSIS — M75.41 IMPINGEMENT SYNDROME OF RIGHT SHOULDER: ICD-10-CM

## 2024-07-10 DIAGNOSIS — S46.111A STRAIN OF MUSCLE, FASCIA AND TENDON OF LONG HEAD OF BICEPS, RIGHT ARM, INITIAL ENCOUNTER: ICD-10-CM

## 2024-07-10 PROCEDURE — 20611 DRAIN/INJ JOINT/BURSA W/US: CPT | Mod: RT

## 2024-07-10 PROCEDURE — 99213 OFFICE O/P EST LOW 20 MIN: CPT | Mod: 25

## 2024-08-15 ENCOUNTER — APPOINTMENT (OUTPATIENT)
Dept: ORTHOPEDIC SURGERY | Facility: CLINIC | Age: 49
End: 2024-08-15

## 2024-08-21 ENCOUNTER — APPOINTMENT (OUTPATIENT)
Dept: ORTHOPEDIC SURGERY | Facility: CLINIC | Age: 49
End: 2024-08-21

## 2024-08-26 ENCOUNTER — APPOINTMENT (OUTPATIENT)
Dept: ORTHOPEDIC SURGERY | Facility: CLINIC | Age: 49
End: 2024-08-26
Payer: OTHER MISCELLANEOUS

## 2024-08-26 DIAGNOSIS — S46.111A STRAIN OF MUSCLE, FASCIA AND TENDON OF LONG HEAD OF BICEPS, RIGHT ARM, INITIAL ENCOUNTER: ICD-10-CM

## 2024-08-26 DIAGNOSIS — M67.813 OTHER SPECIFIED DISORDERS OF TENDON, RIGHT SHOULDER: ICD-10-CM

## 2024-08-26 PROCEDURE — 99213 OFFICE O/P EST LOW 20 MIN: CPT

## 2024-08-26 NOTE — HISTORY OF PRESENT ILLNESS
[FreeTextEntry1] : RIGHT SHOULDER PAIN  FOLLOW UP - POST OP   AUGUST 22, 2023, - RIGHT BICEPS TENODESIS, ARLETTE, PASTA 3MM  MRI RESULTS TODAY    PAIN LEVEL: 7/10 - FEELS PULL IN IN THE ARM  PT WAS GOING TO PHYSCIAL THERPAY 2 X WEEK-HELPUL - BUT NO LONGER AUTHORIZED BY WORK COMP  STILL HAS NOT MET MILESTONES TO RETURN TO WORK  AT HOME EXERCISES-HELPFUL    DATE OF INJURY: 02/12/2023- PATIENT STATED THAT HE IS A - PATIENT SAID HE WAS Lifting A HEAVY PIECE APPROX. 30 LBS TO INSERT ON MACHINE AND HE FELT A SHARP PULL IN RIGHT SHOULDER     PREVIOUS HPI AUGUST 22, 2023, - RIGHT BICEPS TENODESIS , ARLETTE, PASTA 3MM  PAIN 8/10 DATE OF INJURY: 02/12/2023- PATIENT STATED THAT HE IS A - PATIENT SAID HE WAS Lifting A HEAVY PIECE APPROX. 30 LBS TO INSERT ON MACHINE AND HE FELT A SHARP PULL IN RIGHT SHOULDER   RIGHT BICEPS RUPTURE - SURGERY AUTHORIZED REQUESTED - NOT APPROVED BY WORKERS COMPENSATION PATIENT DID 6-7 SESSIONS OF PHYSICAL THERPAY WITH NO RELIEF- PAIN HAS GOTTEN WORSE  QUALITY: SHARP, ACHY, THROBBING  RADIATING PAIN UP NECK  CONSTANT  PAIN LEVEL: 10/10  BETTER WITH RESTING, ICE, PACKS,  WORSE WITH OVER HEAD LIFTING, REACHING BEHIND, AT NIGHT , UNAABLE TO SLEEP   ASSOCIATED SYMPTOMS: LIMITED ROM  ASSOCIATED CLICKING/LOCKING/POPPING/GRINDING  PRIOR STUDIES: X-RAY REPORT  PT VISITED [facility] 02/12/23- PT WAS PRESCRIBED IBUPROFEN AND HAD AN INJECTION, GAVE PT A SLING

## 2024-08-26 NOTE — PHYSICAL EXAM
[de-identified] : POST OP SHOULDER EXAM  AUGUST 22, 2023, - RIGHT BICEPS TENODESIS, ARLETTE, PASTA 3MM   PORTALS HEALING WELL - NO ERYTHEMA OR CALOR  TENDER PROXIMAL BICEPS AT TENODESIS SITE  TENDER MEDIAL SCAPULAR BORDER - TRIGGER POINTS   AROM  120 / 130 / 80 / 30   DISTAL CMS INTACT [de-identified] : Date of Exam: 08-   EXAM:  MRI RIGHT SHOULDER WITHOUT CONTRAST   IMPRESSION:  1. Patient status post acromioplasty/Jeremy procedure. 2. Study negative for partial or full-thickness rotator cuff tear. There is mild tendinosis in the supraspinatus tendon, and moderate tendinosis with some acute inflammatory change in the infraspinatus tendon. 3. Status post long biceps tenodesis procedure. 4. Study negative for labral tear.

## 2024-08-26 NOTE — ASSESSMENT
[Can the patient return to usual work activities as indicated? If yes, indicate date___] : The patient can return to usual work activities on [unfilled] [Are there any work limitations? (If so, explain and quantify, including the anticipated duration of the limitations)] : There are no work limitations.  [FreeTextEntry7] : RETURN TO REGULAR DUTY WORK SEPTEMBER 3, 2024

## 2024-09-12 ENCOUNTER — APPOINTMENT (OUTPATIENT)
Dept: ORTHOPEDIC SURGERY | Facility: CLINIC | Age: 49
End: 2024-09-12

## 2024-09-12 DIAGNOSIS — M75.41 IMPINGEMENT SYNDROME OF RIGHT SHOULDER: ICD-10-CM

## 2024-09-12 DIAGNOSIS — S46.111A STRAIN OF MUSCLE, FASCIA AND TENDON OF LONG HEAD OF BICEPS, RIGHT ARM, INITIAL ENCOUNTER: ICD-10-CM

## 2024-09-12 DIAGNOSIS — M67.813 OTHER SPECIFIED DISORDERS OF TENDON, RIGHT SHOULDER: ICD-10-CM

## 2024-09-12 PROCEDURE — 20611 DRAIN/INJ JOINT/BURSA W/US: CPT | Mod: RT

## 2024-09-12 PROCEDURE — 99213 OFFICE O/P EST LOW 20 MIN: CPT | Mod: 25

## 2024-09-12 NOTE — PHYSICAL EXAM
[de-identified] : POST OP SHOULDER EXAM  AUGUST 22, 2023, - RIGHT BICEPS TENODESIS, ARLETTE, PASTA 3MM   PORTALS HEALING WELL - NO ERYTHEMA OR CALOR  TENDER PROXIMAL BICEPS AT TENODESIS SITE  TENDER MEDIAL SCAPULAR BORDER - TRIGGER POINTS   AROM  100 / 100  DISTAL CMS INTACT

## 2024-09-12 NOTE — PROCEDURE
[de-identified] : INJECTION RIGHT SHOULDER SA SPACE  Patient has demonstrated limited relief from NSAIDS, rest, exercises / PT, and after discussion of the risks and benefits, the patient has elected to proceed with an ULTRASOUND GUIDED injection into the RIGHT SUBACROMIAL  SPACE LATERAL APPROACH    Confirmed that the patient does not have history of prior adverse reactions, active, infections, or relevant allergies. There was no effusion, erythema, or warmth, and the skin was clear  The skin was sterilized with alcohol. Ethyl Chloride was used as a topical anesthetic. Routine sterile technique.  The site was injected UTILIZING ULTRASOUND GUIDANCE to confirm appropriate placement of the needle- with a mixture of medication and local anesthetic. The injection was completed without complication and a bandage was applied.   The patient tolerated the procedure well and was given post-injection instructions.Rec: Cold therapy, analgesics, avoid heavy activity. MEDICATION: 4cc of 1% xylocaine + KETOROLAC 60mg LOT:[id number] EXPIRATION:06/2025

## 2024-09-12 NOTE — HISTORY OF PRESENT ILLNESS
[FreeTextEntry1] : RIGHT SHOULDER PAIN  FOLLOW UP - POA PAIN LEVEL:9/10   PT RETURNED TO WORK-PAIN AGGRAVTED BY LIFTING BOXES OVERHEAD  AUGUST 22, 2023, - RIGHT BICEPS TENODESIS, ARLETTE, PASTA 3MM  AT HOME EXERCISES  JULT 10 10MG KENALOG INJECTION    PAIN LEVEL: 7/10 - FEELS PULL IN IN THE ARM  PT WAS GOING TO PHYSCIAL THERPAY 2 X WEEK-HELPUL - BUT NO LONGER AUTHORIZED BY WORK COMP  STILL HAS NOT MET MILESTONES TO RETURN TO WORK  AT HOME EXERCISES-HELPFUL    DATE OF INJURY: 02/12/2023- PATIENT STATED THAT HE IS A - PATIENT SAID HE WAS Lifting A HEAVY PIECE APPROX. 30 LBS TO INSERT ON MACHINE AND HE FELT A SHARP PULL IN RIGHT SHOULDER     PREVIOUS HPI AUGUST 22, 2023, - RIGHT BICEPS TENODESIS , ARLETTE, PASTA 3MM  PAIN 8/10 DATE OF INJURY: 02/12/2023- PATIENT STATED THAT HE IS A - PATIENT SAID HE WAS Lifting A HEAVY PIECE APPROX. 30 LBS TO INSERT ON MACHINE AND HE FELT A SHARP PULL IN RIGHT SHOULDER   RIGHT BICEPS RUPTURE - SURGERY AUTHORIZED REQUESTED - NOT APPROVED BY WORKERS COMPENSATION PATIENT DID 6-7 SESSIONS OF PHYSICAL THERPAY WITH NO RELIEF- PAIN HAS GOTTEN WORSE  QUALITY: SHARP, ACHY, THROBBING  RADIATING PAIN UP NECK  CONSTANT  PAIN LEVEL: 10/10  BETTER WITH RESTING, ICE, PACKS,  WORSE WITH OVER HEAD LIFTING, REACHING BEHIND, AT NIGHT , UNAABLE TO SLEEP   ASSOCIATED SYMPTOMS: LIMITED ROM  ASSOCIATED CLICKING/LOCKING/POPPING/GRINDING  PRIOR STUDIES: X-RAY REPORT  PT VISITED [facility] 02/12/23- PT WAS PRESCRIBED IBUPROFEN AND HAD AN INJECTION, GAVE PT A SLING

## 2024-09-12 NOTE — DISCUSSION/SUMMARY
[de-identified] : UNABLE TO CONTINUE WORKING DUE TO PAIN  LIFTING BOXES AT WORK MADE PAIN WORSE AND RANGE OF MOTION  LESS  RETURN TO TOTAL DISABILTY

## 2024-10-21 ENCOUNTER — APPOINTMENT (OUTPATIENT)
Dept: ORTHOPEDIC SURGERY | Facility: CLINIC | Age: 49
End: 2024-10-21

## 2024-11-11 ENCOUNTER — APPOINTMENT (OUTPATIENT)
Dept: ORTHOPEDIC SURGERY | Facility: CLINIC | Age: 49
End: 2024-11-11
Payer: OTHER MISCELLANEOUS

## 2024-11-11 DIAGNOSIS — M25.511 PAIN IN RIGHT SHOULDER: ICD-10-CM

## 2024-11-11 DIAGNOSIS — M75.41 IMPINGEMENT SYNDROME OF RIGHT SHOULDER: ICD-10-CM

## 2024-11-11 DIAGNOSIS — S46.111A STRAIN OF MUSCLE, FASCIA AND TENDON OF LONG HEAD OF BICEPS, RIGHT ARM, INITIAL ENCOUNTER: ICD-10-CM

## 2024-11-11 PROCEDURE — 99213 OFFICE O/P EST LOW 20 MIN: CPT

## 2024-11-13 ENCOUNTER — APPOINTMENT (OUTPATIENT)
Dept: ORTHOPEDIC SURGERY | Facility: CLINIC | Age: 49
End: 2024-11-13

## 2024-12-23 ENCOUNTER — APPOINTMENT (OUTPATIENT)
Dept: ORTHOPEDIC SURGERY | Facility: CLINIC | Age: 49
End: 2024-12-23

## 2025-01-31 ENCOUNTER — APPOINTMENT (OUTPATIENT)
Dept: ORTHOPEDIC SURGERY | Facility: CLINIC | Age: 50
End: 2025-01-31